# Patient Record
Sex: MALE | Race: WHITE | NOT HISPANIC OR LATINO | Employment: OTHER | URBAN - METROPOLITAN AREA
[De-identification: names, ages, dates, MRNs, and addresses within clinical notes are randomized per-mention and may not be internally consistent; named-entity substitution may affect disease eponyms.]

---

## 2024-02-05 ENCOUNTER — TELEPHONE (OUTPATIENT)
Dept: UROLOGY | Facility: CLINIC | Age: 74
End: 2024-02-05
Payer: MEDICARE

## 2024-02-05 NOTE — TELEPHONE ENCOUNTER
Spoke with patient , advised patient Dr Dominguez does not have any sooner availability at this time than appointment patient is scheduled for , patient re assured he is on the waiting list should sooner availability open his appointment will be moved up . Patient expressed understanding.

## 2024-02-05 NOTE — TELEPHONE ENCOUNTER
----- Message from Miriam Malloy sent at 2/5/2024  4:27 PM CST -----  Name of Who is Calling:  PT CALLED        What is the request in detail:    THE PT HAS BEEN SELF CATHETER FOR 3 MONTHS  BECAUSE OF HIS ENLARGED PROSTATE AND HE WANTS TO KNOW IF HE IS A CANDIDATE FOR THE NEW  PROSTATE TREATMENT. HE HAS A APPT ON APRIL 23 COMING FROM [place] AND HE WOULD LIKE TO BE SEEN SOONER. PLEASE ADVISE       Can the clinic reply by [facility]: NO          What Number to Call Back if not in [facility]: Telephone Information:  Mobile          [phone]